# Patient Record
Sex: FEMALE | Race: WHITE | URBAN - METROPOLITAN AREA
[De-identification: names, ages, dates, MRNs, and addresses within clinical notes are randomized per-mention and may not be internally consistent; named-entity substitution may affect disease eponyms.]

---

## 2018-09-19 ENCOUNTER — ED HISTORICAL/CONVERTED ENCOUNTER (OUTPATIENT)
Dept: OTHER | Age: 48
End: 2018-09-19

## 2020-07-23 ENCOUNTER — OFFICE VISIT (OUTPATIENT)
Dept: PRIMARY CARE CLINIC | Age: 50
End: 2020-07-23
Payer: COMMERCIAL

## 2020-07-23 VITALS
DIASTOLIC BLOOD PRESSURE: 66 MMHG | TEMPERATURE: 97.1 F | RESPIRATION RATE: 18 BRPM | HEART RATE: 87 BPM | SYSTOLIC BLOOD PRESSURE: 118 MMHG | OXYGEN SATURATION: 98 %

## 2020-07-23 DIAGNOSIS — R50.9 FEVER, UNSPECIFIED FEVER CAUSE: Primary | ICD-10-CM

## 2020-07-23 DIAGNOSIS — H66.91 ACUTE BACTERIAL OTITIS MEDIA, RIGHT: ICD-10-CM

## 2020-07-23 PROCEDURE — 99203 OFFICE O/P NEW LOW 30 MIN: CPT | Performed by: NURSE PRACTITIONER

## 2020-07-23 RX ORDER — AMOXICILLIN 500 MG/1
500 CAPSULE ORAL 3 TIMES DAILY
Qty: 30 CAP | Refills: 0 | Status: SHIPPED | OUTPATIENT
Start: 2020-07-23 | End: 2020-08-02

## 2020-07-23 RX ORDER — NEOMYCIN SULFATE, POLYMYXIN B SULFATE AND HYDROCORTISONE 10; 3.5; 1 MG/ML; MG/ML; [USP'U]/ML
3 SUSPENSION/ DROPS AURICULAR (OTIC) 4 TIMES DAILY
Qty: 7.5 ML | Refills: 0 | Status: SHIPPED | OUTPATIENT
Start: 2020-07-23 | End: 2020-08-02

## 2020-07-23 NOTE — PATIENT INSTRUCTIONS
Per CDC guideline patient is to self quarantine until the results of the Covid 19 testing is completed. Also given written instruction on when to discontinue self quarantine as follows: (1)  3 days have passed since last fever without the use of fever reducing medications (2) AND improvement of symptoms AND (3) at least 3 days have passed since symptoms first appeared AND (4) 10 days have passed since first positive Covid 19 test (Patient has not tested as positive at this time and test results should be back in 6 to 10 days)Per CDC guideline patient is to self quarantine until the results of the Covid 19 testing is completed.   Also given written instruction on when to discontinue self quarantine as follows: (1)  3 days have passed since last fever without the use of fever reducing medications (2) AND improvement of symptoms AND (3) at least 3 days have passed since symptoms first appeared AND (4) 10 days have passed since first positive Covid 19 test (Patient has not tested as positive at this time and test results should be back in 6 to 10 days)

## 2020-07-23 NOTE — PROGRESS NOTES
Cynthia Beltran is a 47 y.o. female presents with symptoms of right ear pain/vertigo when she lays on her right side, fever, sore throat, nasal congestion and headache. She has a history of migraines and has not taken anything to make her symptoms better or worse. This all started  and she thinks that she has the ear infection because she works for the [employer] and forgot her stethoscope the last day she worked and had to use the community stethoscope. Chief Complaint   Patient presents with    Cough    Nasal Congestion    Headache    Sore Throat    Dizziness             No past medical history on file.   Family History   Family history unknown: Yes     Social History     Socioeconomic History    Marital status: Not on file     Spouse name: Not on file    Number of children: Not on file    Years of education: Not on file    Highest education level: Not on file   Occupational History    Not on file   Social Needs    Financial resource strain: Not on file    Food insecurity     Worry: Not on file     Inability: Not on file    Transportation needs     Medical: Not on file     Non-medical: Not on file   Tobacco Use    Smoking status: Former Smoker     Packs/day: 1.00     Years: 5.00     Pack years: 5.00     Types: Cigarettes     Last attempt to quit:      Years since quittin.5    Smokeless tobacco: Never Used   Substance and Sexual Activity    Alcohol use: Not on file    Drug use: Not on file    Sexual activity: Not on file   Lifestyle    Physical activity     Days per week: Not on file     Minutes per session: Not on file    Stress: Not on file   Relationships    Social connections     Talks on phone: Not on file     Gets together: Not on file     Attends Voodoo service: Not on file     Active member of club or organization: Not on file     Attends meetings of clubs or organizations: Not on file     Relationship status: Not on file    Intimate partner violence     Fear of current or ex partner: Not on file     Emotionally abused: Not on file     Physically abused: Not on file     Forced sexual activity: Not on file   Other Topics Concern    Not on file   Social History Narrative    Not on file     No current outpatient medications on file prior to visit. No current facility-administered medications on file prior to visit. Allergies   Allergen Reactions    Tylenol-Codeine #3 [Acetaminophen-Codeine] Other (comments)     Headache               Cough   The history is provided by the patient. This is a new problem. The current episode started more than 2 days ago. The problem occurs daily. The problem has not changed since onset. Associated symptoms include headaches. Nothing aggravates the symptoms. Nothing relieves the symptoms. She has tried nothing for the symptoms. The treatment provided no relief. Nasal Congestion    Associated symptoms include sore throat, cough and headaches. Headache   Associated symptoms include headaches. Sore Throat    Associated symptoms include headaches and cough. Dizziness    Associated symptoms include headaches and dizziness. Review of Systems   HENT: Positive for sore throat. Respiratory: Positive for cough. Neurological: Positive for dizziness and headaches. Objective  Physical Exam  Vitals signs reviewed. Constitutional:       Appearance: Normal appearance. She is normal weight. HENT:      Head: Normocephalic. Neck:      Musculoskeletal: Normal range of motion. Cardiovascular:      Rate and Rhythm: Normal rate and regular rhythm. Pulmonary:      Effort: Pulmonary effort is normal.      Breath sounds: Normal breath sounds. Abdominal:      General: Bowel sounds are normal.      Palpations: Abdomen is soft. Musculoskeletal: Normal range of motion. Skin:     General: Skin is warm and dry. Neurological:      Mental Status: She is alert and oriented to person, place, and time. Psychiatric:         Mood and Affect: Mood normal.         Behavior: Behavior normal.         Thought Content: Thought content normal.         Judgment: Judgment normal.          Assessment & Plan  Self administered nasal swab obtained and sent to [facility] for testing for covid 19 patient is to self quarantine until test results are obtained. Please see patient instructions for written instructions given to patient about parameters of self quarantine with symptoms/positive covid testing . Patient intructed to treate symptoms with OTC meds.   Calling in script for right ear pain  Nhung Ramos NP

## 2020-07-27 LAB — SARS-COV-2, NAA: NOT DETECTED

## 2020-07-28 ENCOUNTER — TELEPHONE (OUTPATIENT)
Dept: PRIMARY CARE CLINIC | Age: 50
End: 2020-07-28

## 2020-07-29 ENCOUNTER — TELEPHONE (OUTPATIENT)
Dept: PRIMARY CARE CLINIC | Age: 50
End: 2020-07-29

## 2023-03-20 ENCOUNTER — DOCUMENTATION ONLY (OUTPATIENT)
Dept: HEMATOLOGY | Age: 53
End: 2023-03-20

## 2023-03-20 NOTE — PROGRESS NOTES
Records received and put in Marjan's box for further review to determine if appointment needs to be moved up

## 2023-04-26 ENCOUNTER — OFFICE VISIT (OUTPATIENT)
Dept: NEUROLOGY | Age: 53
End: 2023-04-26
Payer: COMMERCIAL

## 2023-04-26 DIAGNOSIS — R41.840 ATTENTION DEFICIT: ICD-10-CM

## 2023-04-26 DIAGNOSIS — G31.84 MILD COGNITIVE IMPAIRMENT: Primary | ICD-10-CM

## 2023-04-26 DIAGNOSIS — F32.1 MODERATE MAJOR DEPRESSION (HCC): ICD-10-CM

## 2023-04-26 DIAGNOSIS — F90.0 ATTENTION DEFICIT HYPERACTIVITY DISORDER (ADHD), INATTENTIVE TYPE, MILD: Chronic | ICD-10-CM

## 2023-04-26 DIAGNOSIS — F41.9 MODERATE ANXIETY: ICD-10-CM

## 2023-04-26 PROCEDURE — 96138 PSYCL/NRPSYC TECH 1ST: CPT | Performed by: CLINICAL NEUROPSYCHOLOGIST

## 2023-04-26 PROCEDURE — 96137 PSYCL/NRPSYC TST PHY/QHP EA: CPT | Performed by: CLINICAL NEUROPSYCHOLOGIST

## 2023-04-26 PROCEDURE — 96139 PSYCL/NRPSYC TST TECH EA: CPT | Performed by: CLINICAL NEUROPSYCHOLOGIST

## 2023-04-26 PROCEDURE — 96132 NRPSYC TST EVAL PHYS/QHP 1ST: CPT | Performed by: CLINICAL NEUROPSYCHOLOGIST

## 2023-04-26 PROCEDURE — 96133 NRPSYC TST EVAL PHYS/QHP EA: CPT | Performed by: CLINICAL NEUROPSYCHOLOGIST

## 2023-04-26 PROCEDURE — 96136 PSYCL/NRPSYC TST PHY/QHP 1ST: CPT | Performed by: CLINICAL NEUROPSYCHOLOGIST

## 2023-07-25 ENCOUNTER — OFFICE VISIT (OUTPATIENT)
Age: 53
End: 2023-07-25
Payer: COMMERCIAL

## 2023-07-25 DIAGNOSIS — G31.84 MILD COGNITIVE IMPAIRMENT OF UNCERTAIN OR UNKNOWN ETIOLOGY: Primary | ICD-10-CM

## 2023-07-25 DIAGNOSIS — R41.840 ATTENTION AND CONCENTRATION DEFICIT: ICD-10-CM

## 2023-07-25 DIAGNOSIS — R41.89 OTHER SYMPTOMS AND SIGNS INVOLVING COGNITIVE FUNCTIONS AND AWARENESS: ICD-10-CM

## 2023-07-25 PROCEDURE — 90832 PSYTX W PT 30 MINUTES: CPT | Performed by: CLINICAL NEUROPSYCHOLOGIST

## 2023-07-25 NOTE — PROGRESS NOTES
Prior to seeing the patient I reviewed the records, including the previously completed report, the records in [facility], and any updated visits from other providers since I saw the patient last.      Today, I engaged in a psychoeducational and supportive and cognitive/behavioral psychotherapy session with the patient. I provided psychotherapy in the form of psychoeducation and support with respect to the results of the recent Neuropsychological Evaluation, including discussing individual tests as well as patient's areas of neurocognitive strength versus weakness. We discussed, in detail, the following: This patient generated a predominantly normal range Neuropsychological Evaluation with respect to neurocognitive  functioning. In this regard, the only impairments noted were for sustained visual attention and bilateral fine motor dexterity. Otherwise, her performances across all other neurocognitive domains assessed are well within or above the normal range. From an emotional standpoint, there is moderate depression and moderate anxiety. It is my opinion that the patient's reported (but not clinically corroborated) day-to-day memory problems are secondary to emotional distress primarily although there is also evidence of mild underlying attention deficit issue. .  In addition to continued  medical care, my recommendations include a review of her psychiatric medication management for depression and anxiety. She should be participating in at least weekly psychotherapy. Consider also an appropriate medication for attention if this is not  medically contraindicated, though caution is advised in selecting same, given the anxiety. With an improvement in mood and attention should bring with it and improve in day-to-day neurocognitive functioning. If not, follow-up neuropsychological evaluation  would then be indicated.   I am not concerned about capacity, driving, day-to-day supervision,

## 2024-01-05 ENCOUNTER — OFFICE VISIT (OUTPATIENT)
Age: 54
End: 2024-01-05
Payer: COMMERCIAL

## 2024-01-05 VITALS
TEMPERATURE: 97.8 F | OXYGEN SATURATION: 98 % | BODY MASS INDEX: 31.82 KG/M2 | DIASTOLIC BLOOD PRESSURE: 98 MMHG | WEIGHT: 179.6 LBS | SYSTOLIC BLOOD PRESSURE: 152 MMHG | HEIGHT: 63 IN | HEART RATE: 91 BPM

## 2024-01-05 DIAGNOSIS — Z86.69 HISTORY OF MIGRAINE HEADACHES: ICD-10-CM

## 2024-01-05 DIAGNOSIS — R16.0 LIVER MASS: ICD-10-CM

## 2024-01-05 DIAGNOSIS — K76.89 LIVER CYST: Primary | ICD-10-CM

## 2024-01-05 PROCEDURE — 99203 OFFICE O/P NEW LOW 30 MIN: CPT | Performed by: INTERNAL MEDICINE

## 2024-01-05 RX ORDER — ALPRAZOLAM 0.25 MG
TABLET ORAL
COMMUNITY
Start: 2023-10-23

## 2024-01-05 RX ORDER — LORATADINE 10 MG/1
TABLET ORAL
COMMUNITY
Start: 2017-10-20 | End: 2024-01-05

## 2024-01-05 RX ORDER — OMEPRAZOLE 10 MG/1
CAPSULE, DELAYED RELEASE ORAL
COMMUNITY
Start: 2023-03-13

## 2024-01-05 RX ORDER — CYCLOBENZAPRINE HCL 5 MG
TABLET ORAL
COMMUNITY
End: 2024-01-05

## 2024-01-05 RX ORDER — ACYCLOVIR 400 MG/1
TABLET ORAL
COMMUNITY
Start: 2022-10-10

## 2024-01-05 RX ORDER — CEFUROXIME AXETIL 250 MG/1
TABLET ORAL
COMMUNITY
Start: 2023-12-04 | End: 2024-01-05

## 2024-01-05 RX ORDER — DEXTROAMPHETAMINE SACCHARATE, AMPHETAMINE ASPARTATE, DEXTROAMPHETAMINE SULFATE, AND AMPHETAMINE SULFATE 1.25; 1.25; 1.25; 1.25 MG/1; MG/1; MG/1; MG/1
TABLET ORAL
COMMUNITY
Start: 2023-10-23

## 2024-01-05 RX ORDER — PROMETHAZINE HYDROCHLORIDE 25 MG/1
25 TABLET ORAL
COMMUNITY

## 2024-01-05 ASSESSMENT — PATIENT HEALTH QUESTIONNAIRE - PHQ9
SUM OF ALL RESPONSES TO PHQ QUESTIONS 1-9: 0
1. LITTLE INTEREST OR PLEASURE IN DOING THINGS: 0
SUM OF ALL RESPONSES TO PHQ QUESTIONS 1-9: 0
2. FEELING DOWN, DEPRESSED OR HOPELESS: 0
SUM OF ALL RESPONSES TO PHQ9 QUESTIONS 1 & 2: 0

## 2024-01-05 NOTE — PROGRESS NOTES
[facility]      Del Renae MD, FACP, FACG, FAASLD      SIRI Blake-CHANTAL Decker, [facility]   Melissa Eduardo, [facility]   Milly Bryson, [facility]-  Stuart Lozano, [facility]   Sara Valentine, [facility]   Shelly Weiron, [facility]   at [facility]   [address], [address]   [place]  [address] [phone]   FAX: [phone]  [facility]   [address], [address]   [place]  [address] [phone]   FAX: [phone]       Patient Care Team:  Amy Bishop MD as PCP - General  Dionte Mathis III, MD (Gastroenterology)      Patient Active Problem List   Diagnosis    Liver cyst    Liver mass    History of migraine headaches       The clinicians listed above have asked me to see Estelita Hernandez in consultation regarding a liver cyst and a liver mass.      All medical records sent by the referring physicians were reviewed including imaging studies     The patient is a 53 y.o. female without any history of previous liver disease.      The patient underwent a CT scan without IV contrast followed for evaluation of non-specific abdominal pain in 2/2023.    This demonstrated several small lesions 1.5 cm in the liver.    An Ultrasound of the liver in 2/2023 demonstrated a single cyst 0.6 cm in the right lobe.  None of the other lesions seen on CT were seen.    An dynamic MRI of the liver in 3/2023 demonstrated several small non-enhancing lesions consistent with small cysts the largest being 1.1 cm.  In inferior right lobe  there is a 1 cm delayed enhancing lesion which is felt to be compatible with adenoma or hemangioma.    AFP was normal.    The patient also underwent EGD and colonoscopy. Both of these were normal as well    In the office today the patient has the following symptoms:  The patient feels

## 2024-01-05 NOTE — PROGRESS NOTES
Identified pt with two pt identifiers(name and ). Reviewed record in preparation for visit and have obtained necessary documentation.    Chief Complaint   Patient presents with    New Patient     Establish care     BP (!) 152/98 (Site: Left Upper Arm, Position: Sitting, Cuff Size: Large Adult)   Pulse 91   Temp 97.8 °F (36.6 °C)   Ht 1.6 m (5' 3\")   Wt 81.5 kg (179 lb 9.6 oz)   SpO2 98%   BMI 31.81 kg/m²       1. \"Have you been to the ER, urgent care clinic since your last visit?  Hospitalized since your last visit?\" No    2. \"Have you seen or consulted any other health care providers outside of the [facility] since your last visit?\" No     Patient is accompanied by self I have received verbal consent from Estelita Hernandez to discuss any/all medical information while they are present in the room.

## 2024-01-05 NOTE — PROGRESS NOTES
Identified pt with two pt identifiers(name and ). Reviewed record in preparation for visit and have obtained necessary documentation.  There were no vitals filed for this visit.     Health Maintenance Review: Patient reminded of \"due or due soon\" health maintenance. I have asked the patient to contact his/her primary care provider (PCP) for follow-up on his/her health maintenance.    Coordination of Care Questionnaire:  :   1) Have you been to an emergency room, urgent care, or hospitalized since your last visit?  If yes, where when, and reason for visit? {yes/ no default no:[id number]}       2. Have seen or consulted any other health care provider since your last visit?   If yes, where when, and reason for visit?  {YES/NO:78577}      Patient is accompanied by {:[id number]} I have received verbal consent from Estelita Hernandez to discuss any/all medical information while they are present in the room.

## 2024-01-06 LAB
ALBUMIN SERPL-MCNC: 4.9 G/DL (ref 3.8–4.9)
ALP SERPL-CCNC: 85 IU/L (ref 44–121)
ALT SERPL-CCNC: 14 IU/L (ref 0–32)
AST SERPL-CCNC: 16 IU/L (ref 0–40)
BASOPHILS # BLD AUTO: 0 X10E3/UL (ref 0–0.2)
BASOPHILS NFR BLD AUTO: 1 %
BILIRUB DIRECT SERPL-MCNC: <0.1 MG/DL (ref 0–0.4)
BILIRUB SERPL-MCNC: 0.3 MG/DL (ref 0–1.2)
BUN SERPL-MCNC: 13 MG/DL (ref 6–24)
BUN/CREAT SERPL: 21 (ref 9–23)
CALCIUM SERPL-MCNC: 9.5 MG/DL (ref 8.7–10.2)
CANCER AG19-9 SERPL-ACNC: 6 U/ML (ref 0–35)
CEA SERPL-MCNC: <0.6 NG/ML (ref 0–4.7)
CHLORIDE SERPL-SCNC: 102 MMOL/L (ref 96–106)
CO2 SERPL-SCNC: 27 MMOL/L (ref 20–29)
CREAT SERPL-MCNC: 0.62 MG/DL (ref 0.57–1)
EGFRCR SERPLBLD CKD-EPI 2021: 106 ML/MIN/1.73
EOSINOPHIL # BLD AUTO: 0.1 X10E3/UL (ref 0–0.4)
EOSINOPHIL NFR BLD AUTO: 1 %
ERYTHROCYTE [DISTWIDTH] IN BLOOD BY AUTOMATED COUNT: 12.4 % (ref 11.7–15.4)
GLUCOSE SERPL-MCNC: 93 MG/DL (ref 70–99)
HBV CORE AB SERPL QL IA: NEGATIVE
HBV SURFACE AB SER QL: REACTIVE
HBV SURFACE AG SERPL QL IA: NEGATIVE
HCT VFR BLD AUTO: 42.1 % (ref 34–46.6)
HCV AB SERPL QL IA: NORMAL
HCV IGG SERPL QL IA: NON REACTIVE
HGB BLD-MCNC: 14.1 G/DL (ref 11.1–15.9)
IMM GRANULOCYTES # BLD AUTO: 0 X10E3/UL (ref 0–0.1)
IMM GRANULOCYTES NFR BLD AUTO: 0 %
INR PPP: 1 (ref 0.9–1.2)
LYMPHOCYTES # BLD AUTO: 1.7 X10E3/UL (ref 0.7–3.1)
LYMPHOCYTES NFR BLD AUTO: 25 %
MCH RBC QN AUTO: 28.8 PG (ref 26.6–33)
MCHC RBC AUTO-ENTMCNC: 33.5 G/DL (ref 31.5–35.7)
MCV RBC AUTO: 86 FL (ref 79–97)
MONOCYTES # BLD AUTO: 0.3 X10E3/UL (ref 0.1–0.9)
MONOCYTES NFR BLD AUTO: 5 %
NEUTROPHILS # BLD AUTO: 4.6 X10E3/UL (ref 1.4–7)
NEUTROPHILS NFR BLD AUTO: 68 %
PLATELET # BLD AUTO: 286 X10E3/UL (ref 150–450)
POTASSIUM SERPL-SCNC: 4.1 MMOL/L (ref 3.5–5.2)
PROT SERPL-MCNC: 7.5 G/DL (ref 6–8.5)
PROTHROMBIN TIME: 10.4 SEC (ref 9.1–12)
RBC # BLD AUTO: 4.89 X10E6/UL (ref 3.77–5.28)
SODIUM SERPL-SCNC: 142 MMOL/L (ref 134–144)
WBC # BLD AUTO: 6.8 X10E3/UL (ref 3.4–10.8)

## 2024-01-09 LAB
AFP L3 MFR SERPL: NORMAL % (ref 0–9.9)
AFP SERPL-MCNC: 1.9 NG/ML (ref 0–9.2)

## 2024-01-31 ENCOUNTER — HOSPITAL ENCOUNTER (OUTPATIENT)
Facility: HOSPITAL | Age: 54
Discharge: HOME | End: 2024-02-03
Payer: COMMERCIAL

## 2024-01-31 DIAGNOSIS — K76.89 LIVER CYST: ICD-10-CM

## 2024-01-31 PROCEDURE — A9577 INJ MULTIHANCE: HCPCS | Performed by: INTERNAL MEDICINE

## 2024-01-31 PROCEDURE — 6360000004 HC RX CONTRAST MEDICATION: Performed by: INTERNAL MEDICINE

## 2024-01-31 PROCEDURE — 74183 MRI ABD W/O CNTR FLWD CNTR: CPT

## 2024-01-31 RX ADMIN — GADOBENATE DIMEGLUMINE 16 ML: 529 INJECTION, SOLUTION INTRAVENOUS at 16:22

## 2024-02-06 ENCOUNTER — OFFICE VISIT (OUTPATIENT)
Age: 54
End: 2024-02-06
Payer: COMMERCIAL

## 2024-02-06 VITALS
HEART RATE: 86 BPM | TEMPERATURE: 97.1 F | OXYGEN SATURATION: 98 % | SYSTOLIC BLOOD PRESSURE: 125 MMHG | WEIGHT: 178 LBS | HEIGHT: 63 IN | BODY MASS INDEX: 31.54 KG/M2 | DIASTOLIC BLOOD PRESSURE: 90 MMHG

## 2024-02-06 DIAGNOSIS — D18.03 LIVER HEMANGIOMA: Primary | ICD-10-CM

## 2024-02-06 PROCEDURE — 99214 OFFICE O/P EST MOD 30 MIN: CPT | Performed by: INTERNAL MEDICINE

## 2024-02-06 NOTE — PROGRESS NOTES
Identified pt with two pt identifiers(name and ). Reviewed record in preparation for visit and have obtained necessary documentation.    Chief Complaint   Patient presents with    Follow-up     BP (!) 125/90 (Site: Left Upper Arm, Position: Sitting, Cuff Size: Large Adult)   Pulse 86   Temp 97.1 °F (36.2 °C) (Temporal)   Resp (!) 80   Ht 1.6 m (5' 3\")   Wt 80.7 kg (178 lb)   SpO2 98%   BMI 31.53 kg/m²       1. \"Have you been to the ER, urgent care clinic since your last visit?  Hospitalized since your last visit?\" No    2. \"Have you seen or consulted any other health care providers outside of the [facility] since your last visit?\" No     Patient is accompanied by self I have received verbal consent from Estelita Hernandez to discuss any/all medical information while they are present in the room.    Pt states she had a physical and some blood work last month    
  Glucose 70 - 99 mg/dL 93       Latest Ref Rng 1/5/2024   JOSELINE - Cancer Screening     AFP 0.0 - 9.2 ng/mL 1.9    AFP-L3% 0.0 - 9.9 % Comment    CA 19-9 0 - 35 U/mL 6    CEA 0.0 - 4.7 ng/mL <0.6        SEROLOGIES:   Latest Ref Rng 1/5/2024   JOSELINE - Serologies     Hep B Surface Ag Negative  Negative    Hep B Core Ab, Total Negative  Negative    Hep B Surface Ab  Reactive    Hep C Ab Non Reactive  Non Reactive        LIVER HISTOLOGY:  Not available or performed    ENDOSCOPIC PROCEDURES:  1/2023.  EGD by Dr Mathis.  Normal  3/2023.  Colonosocpy by Dr Mathis.  Normal    RADIOLOGY:  2/2023.  CT scan abdomen without IV contrast.  Normal appearing liver with multiple hypodense lesions the larges 1.5 cm.  Normal spleen.  No ascites.    2/2023.  Ultrasound of liver.  Multiple small cysts.  Largest 0.6 cm in right lobe.  No dilated bile ducts.  No ascites.    3/2023.  Dynamic MRI of liver.  Normal appearing liver.  Multiple small liver cysts the largest is 1.1 cm I right lobe.  A 1 cm delayed enhancing lesion in inferior right lobe.  Normal spleen.  No dilated bile ducts.  No bile duct strictures.  No ascites.    2/2024.  Dynamic MRI of liver.  Normal appearing liver.  Liver mass consistent with hemangioma in the right lobe measuring 1.1 cm.    OTHER TESTING:  Not available or performed    FOLLOW-UP:  All of the issues listed above in the Assessment and Plan were discussed with the patient.  All questions were answered.  The patient expressed a clear understanding of the above.    No follow-up at [facility] is needed.  I would be glad to see the patient back for follow-up at any time in the future if the clinical situation changes.      Del Renae MD  [address], [address]  [place]  [address] [phone]  [facility]

## 2024-02-19 ENCOUNTER — HOSPITAL ENCOUNTER (EMERGENCY)
Facility: HOSPITAL | Age: 54
Discharge: HOME | End: 2024-02-19
Attending: EMERGENCY MEDICINE
Payer: COMMERCIAL

## 2024-02-19 VITALS
BODY MASS INDEX: 31.89 KG/M2 | HEIGHT: 63 IN | RESPIRATION RATE: 16 BRPM | HEART RATE: 74 BPM | WEIGHT: 180 LBS | TEMPERATURE: 98.2 F | OXYGEN SATURATION: 100 % | DIASTOLIC BLOOD PRESSURE: 98 MMHG | SYSTOLIC BLOOD PRESSURE: 140 MMHG

## 2024-02-19 DIAGNOSIS — Z77.21 EXPOSURE TO BLOOD OR BODY FLUID: Primary | ICD-10-CM

## 2024-02-19 LAB
HBV SURFACE AB SER QL: REACTIVE
HBV SURFACE AB SER-ACNC: 234.23 MIU/ML
HCV AB SER IA-ACNC: 0.1 INDEX
HCV AB SERPL QL IA: NONREACTIVE
HIV 1+2 AB+HIV1 P24 AG SERPL QL IA: NONREACTIVE
HIV 1/2 RESULT COMMENT: NORMAL

## 2024-02-19 PROCEDURE — 87389 HIV-1 AG W/HIV-1&-2 AB AG IA: CPT

## 2024-02-19 PROCEDURE — 86706 HEP B SURFACE ANTIBODY: CPT

## 2024-02-19 PROCEDURE — 99282 EMERGENCY DEPT VISIT SF MDM: CPT

## 2024-02-19 PROCEDURE — 86803 HEPATITIS C AB TEST: CPT

## 2024-02-19 PROCEDURE — 36415 COLL VENOUS BLD VENIPUNCTURE: CPT

## 2024-02-19 ASSESSMENT — PAIN - FUNCTIONAL ASSESSMENT: PAIN_FUNCTIONAL_ASSESSMENT: NONE - DENIES PAIN

## 2024-02-19 NOTE — ED NOTES
Pt to await results from testing and will be contacted with said results. Pt to also check her mychart for results. Pt has no questions for myself or the provider. Pt ambulatory with a strong and steady gait upon DC.

## 2024-02-19 NOTE — ED PROVIDER NOTES
Skin:     General: Skin is warm and dry.      Capillary Refill: Capillary refill takes less than 2 seconds.   Neurological:      General: No focal deficit present.      Mental Status: She is alert. Mental status is at baseline.            SCREENINGS                  LAB, EKG AND DIAGNOSTIC RESULTS   Labs:  No results found for this or any previous visit (from the past 12 hour(s)).    EKG: Not Applicable    Radiologic Studies:  Non-plain film images such as CT, Ultrasound and MRI are read by the radiologist. Plain radiographic images are visualized and preliminarily interpreted by the ED Physician with the following findings: Not Applicable.    Interpretation per the Radiologist below, if available at the time of this note:  No orders to display        ED COURSE and DIFFERENTIAL DIAGNOSIS/MDM   10:05 AM DDx, ED Course, and Reassessment: Patient here after exposure to an IO needle that was used during a cardiac arrest.  Here for testing for any possible postexposure prophylaxis    Records Reviewed (source and summary of external notes): Prior medical records and Nursing notes    Vitals:    Vitals:    02/19/24 0633 02/19/24 0845   BP: (!) 141/91 (!) 140/98   Pulse: 74 74   Resp: 18 16   Temp: 98.2 °F (36.8 °C)    TempSrc: Oral    SpO2: 99% 100%   Weight: 81.6 kg (180 lb)    Height: 1.6 m (5' 3\")         ED COURSE  ED Course as of 02/19/24 1005   Mon Feb 19, 2024   1004 HIV and hepatitis testing ordered.  Patient has spoken with her employers and is aware of the protocol as well.  [facility] [KK[facility]      ED Course User Index  [KK] Carla Winkler MD       SEPSIS Reassessment: Sepsis reassessment not applicable    Clinical Management Tools:  Not Applicable    Patient was given the following medications:  Medications - No data to display    CONSULTS:   None     Social Determinants affecting Diagnosis/Treatment: None    Smoking Cessation: Not Applicable    PROCEDURES   Unless otherwise

## 2024-02-19 NOTE — ED TRIAGE NOTES
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

## 2024-04-25 ENCOUNTER — TELEPHONE (OUTPATIENT)
Age: 54
End: 2024-04-25

## 2024-04-25 NOTE — TELEPHONE ENCOUNTER
Called and confirmed appt and verified where to send link for virtual visit. - patient may call back with a different number to send the link to.

## 2024-04-26 ENCOUNTER — TELEPHONE (OUTPATIENT)
Age: 54
End: 2024-04-26

## 2024-04-26 ENCOUNTER — TELEMEDICINE (OUTPATIENT)
Age: 54
End: 2024-04-26
Payer: COMMERCIAL

## 2024-04-26 DIAGNOSIS — F41.9 ANXIETY AND DEPRESSION: ICD-10-CM

## 2024-04-26 DIAGNOSIS — F32.A ANXIETY AND DEPRESSION: ICD-10-CM

## 2024-04-26 DIAGNOSIS — G31.84 MILD COGNITIVE IMPAIRMENT OF UNCERTAIN OR UNKNOWN ETIOLOGY: Primary | ICD-10-CM

## 2024-04-26 PROCEDURE — 90791 PSYCH DIAGNOSTIC EVALUATION: CPT | Performed by: CLINICAL NEUROPSYCHOLOGIST

## 2024-04-26 NOTE — PROGRESS NOTES
Estelita Hernandez, was evaluated through a synchronous (real-time) audio-video encounter. The patient (or guardian if applicable) is aware that this is a billable service, which includes applicable co-pays. This Virtual Visit was conducted with patient's (and/or legal guardian's) consent. Patient identification was verified, and a caregiver was present when appropriate.   The patient was located at Home: [address] [place] [id number]  Provider was located at Facility (Appt Dept): [address] [place] [address]  [address] [place], [place] [id number]  Confirm you are appropriately licensed, registered, or certified to deliver care in the state where the patient is located as indicated above. If you are not or unsure, please re-schedule the visit: Yes, I confirm.     Estelita Hernandez (:  1970) is a New patient, presenting virtually for evaluation of the following:      [facility]  NEUROLOGY CLINIC   [address] [place] [address]   [phone] Office   [phone] Fax      Neuropsychology    Exam # 2    Initial Diagnostic Interview Note      Referral:  Amy Bishop MD    Estelita Hernandez is a 53 y.o. right handed   female who was unaccompanied to the initial clinical interview on 24 .  Please refer to her medical records for details pertaining to her history.   At the start of the appointment, I reviewed the patient's [facility] Epic Chart (including Media scanned in from previous providers) for the active Problem List, all pertinent Past Medical Hx, medications, recent radiologic and laboratory findings.  In addition, I reviewed pt's documented Immunization Record and Encounter History.       Pursuant to the emergency declaration under the Parikh Act and the National Emergencies Act, 1135 waiver authority and the Coronavirus Preparedness and Response Supplemental Appropriations Act, this Virtual

## 2024-04-29 ENCOUNTER — TELEPHONE (OUTPATIENT)
Age: 54
End: 2024-04-29

## 2024-05-14 ENCOUNTER — TELEPHONE (OUTPATIENT)
Age: 54
End: 2024-05-14

## 2024-05-14 NOTE — TELEPHONE ENCOUNTER
Patient is calling to advise that she tested positive for Lupus and wanted to advise incase that could be causing an interaction with the medications such as Adderall or Strattera.    Please advise.

## 2024-05-14 NOTE — TELEPHONE ENCOUNTER
Patient called to report she tested positive for Lupus and needs to know if she should be referred to another specialist / plan of care.        5/14/24@4277 Patient would like lab work if possible to check her liver.(KF)     5/17/24@1126 Place labs in the system and patient will send a [employer]i [employer] message on which [facility] she would like labs sent too. (CHAR)

## 2024-05-17 ENCOUNTER — TELEPHONE (OUTPATIENT)
Age: 54
End: 2024-05-17

## 2024-05-17 DIAGNOSIS — D18.03 LIVER HEMANGIOMA: Primary | ICD-10-CM

## 2024-05-20 ENCOUNTER — PROCEDURE VISIT (OUTPATIENT)
Age: 54
End: 2024-05-20
Payer: COMMERCIAL

## 2024-05-20 DIAGNOSIS — F90.0 ADHD (ATTENTION DEFICIT HYPERACTIVITY DISORDER), INATTENTIVE TYPE: Chronic | ICD-10-CM

## 2024-05-20 DIAGNOSIS — G31.84 MILD COGNITIVE IMPAIRMENT OF UNCERTAIN OR UNKNOWN ETIOLOGY: Primary | ICD-10-CM

## 2024-05-20 DIAGNOSIS — F32.A ANXIETY AND DEPRESSION: ICD-10-CM

## 2024-05-20 DIAGNOSIS — F41.9 ANXIETY AND DEPRESSION: ICD-10-CM

## 2024-05-20 PROCEDURE — 96132 NRPSYC TST EVAL PHYS/QHP 1ST: CPT | Performed by: CLINICAL NEUROPSYCHOLOGIST

## 2024-05-20 PROCEDURE — 96133 NRPSYC TST EVAL PHYS/QHP EA: CPT | Performed by: CLINICAL NEUROPSYCHOLOGIST

## 2024-05-20 PROCEDURE — 96139 PSYCL/NRPSYC TST TECH EA: CPT | Performed by: CLINICAL NEUROPSYCHOLOGIST

## 2024-05-20 PROCEDURE — 96138 PSYCL/NRPSYC TECH 1ST: CPT | Performed by: CLINICAL NEUROPSYCHOLOGIST

## 2024-05-21 NOTE — PROGRESS NOTES
those relationships that are maintained.  Self-concept is harsh and negative and she is inwardly troubled by self-doubt and misgivings about her adequacy than readily apparent to others.  She will tend to feel helpless and overwhelmed under mild pressure and may dependently seek the assistance of others.  Her self-reported level of treatment motivation remains somewhat low.       Impressions & Recommendations:  This patient has now undergone two Neuropsychological Evaluations with respect to neurocognitive  functioning.  In this regard, on first examination, the patient was found with chronic underlying attention deficit issue exacerbated by moderate depression and anxiety.  She was put on medication for attention.  In the time between exam 1 and exam 2, she reports a decline in memory related abilities over time.  Comparison of current with previous neurocognitive testing does not show an organic decline in neurocognitive functioning.  Processing speed has improved.  Attention deficit issues remain present, though, despite treatment for same.  From an emotional standpoint, moderate anxiety and depression has improved mild.  See personality profile above.      Thankfully and reassuringly, this profile remains inconsistent with MCI or dementia.  Instead, this remains a case of chronic ADHD-inattentive exacerbated by aging and mood related concerns.  I suggest a review of her current medication management for attention as well as counseling to assist with appears to be primarily functional depression and anxiety symptoms.  Consider psychiatric interventions if needed as well.  Exam reassuring.  I am not concerned about capacity, driving, day-to-day supervision, etc.  The patient to be encouraged to remain as mentally, physically, and socially active as possible.  We now have extensive baseline and updated neurocognitive and psychologic data on her.  Follow up as needed.  Clinical correlation is, of course,

## 2024-05-30 ENCOUNTER — TELEPHONE (OUTPATIENT)
Age: 54
End: 2024-05-30

## 2024-05-30 NOTE — TELEPHONE ENCOUNTER
Patient returning phone call from  to give [facility] fax # [phone].        5/30/24@7125 Labs faxed to number given per patient request.(CHAR)

## 2024-05-30 NOTE — TELEPHONE ENCOUNTER
Patient requesting a call to discuss being diagnosed by [facility] for [place] Palsdana.    She wants to speak with Dr. Alford about this.     She's requesting him to give her a recommendation for a doctor.

## 2024-05-31 LAB
AFP L3 MFR SERPL: NORMAL % (ref 0–9.9)
AFP SERPL-MCNC: 2 NG/ML (ref 0–9.2)
ALBUMIN SERPL-MCNC: 4.3 G/DL (ref 3.8–4.9)
ALP SERPL-CCNC: 82 IU/L (ref 44–121)
ALT SERPL-CCNC: 11 IU/L (ref 0–32)
AST SERPL-CCNC: 14 IU/L (ref 0–40)
BASOPHILS # BLD AUTO: 0 X10E3/UL (ref 0–0.2)
BASOPHILS NFR BLD AUTO: 1 %
BILIRUB DIRECT SERPL-MCNC: <0.1 MG/DL (ref 0–0.4)
BILIRUB SERPL-MCNC: 0.3 MG/DL (ref 0–1.2)
BUN SERPL-MCNC: 15 MG/DL (ref 6–24)
BUN/CREAT SERPL: 24 (ref 9–23)
CALCIUM SERPL-MCNC: 9 MG/DL (ref 8.7–10.2)
CANCER AG19-9 SERPL-ACNC: 5 U/ML (ref 0–35)
CEA SERPL-MCNC: <0.6 NG/ML (ref 0–4.7)
CHLORIDE SERPL-SCNC: 106 MMOL/L (ref 96–106)
CO2 SERPL-SCNC: 22 MMOL/L (ref 20–29)
CREAT SERPL-MCNC: 0.63 MG/DL (ref 0.57–1)
EGFRCR SERPLBLD CKD-EPI 2021: 105 ML/MIN/1.73
EOSINOPHIL # BLD AUTO: 0.1 X10E3/UL (ref 0–0.4)
EOSINOPHIL NFR BLD AUTO: 1 %
ERYTHROCYTE [DISTWIDTH] IN BLOOD BY AUTOMATED COUNT: 13 % (ref 11.7–15.4)
GLUCOSE SERPL-MCNC: 74 MG/DL (ref 70–99)
HBV CORE AB SERPL QL IA: NEGATIVE
HBV SURFACE AB SER QL: REACTIVE
HBV SURFACE AG SERPL QL IA: NEGATIVE
HCT VFR BLD AUTO: 39.2 % (ref 34–46.6)
HCV AB SERPL QL IA: NORMAL
HCV IGG SERPL QL IA: NON REACTIVE
HGB BLD-MCNC: 12.8 G/DL (ref 11.1–15.9)
IMM GRANULOCYTES # BLD AUTO: 0 X10E3/UL (ref 0–0.1)
IMM GRANULOCYTES NFR BLD AUTO: 0 %
INR PPP: 1 (ref 0.9–1.2)
LYMPHOCYTES # BLD AUTO: 1.5 X10E3/UL (ref 0.7–3.1)
LYMPHOCYTES NFR BLD AUTO: 23 %
MCH RBC QN AUTO: 29.2 PG (ref 26.6–33)
MCHC RBC AUTO-ENTMCNC: 32.7 G/DL (ref 31.5–35.7)
MCV RBC AUTO: 90 FL (ref 79–97)
MONOCYTES # BLD AUTO: 0.3 X10E3/UL (ref 0.1–0.9)
MONOCYTES NFR BLD AUTO: 5 %
NEUTROPHILS # BLD AUTO: 4.7 X10E3/UL (ref 1.4–7)
NEUTROPHILS NFR BLD AUTO: 70 %
PLATELET # BLD AUTO: 266 X10E3/UL (ref 150–450)
POTASSIUM SERPL-SCNC: 4.1 MMOL/L (ref 3.5–5.2)
PROT SERPL-MCNC: 6.4 G/DL (ref 6–8.5)
PROTHROMBIN TIME: 10.6 SEC (ref 9.1–12)
RBC # BLD AUTO: 4.38 X10E6/UL (ref 3.77–5.28)
SODIUM SERPL-SCNC: 144 MMOL/L (ref 134–144)
WBC # BLD AUTO: 6.6 X10E3/UL (ref 3.4–10.8)

## 2024-06-01 NOTE — TELEPHONE ENCOUNTER
Returned call and patient stated that every time she is on medication for her ADD she was having right sided facial pain.  She then started having the pain without being on medication,  [facility] diagnosed her with Bell's Palsy, She then returned because of the pain, and they said it was her bulging ear and placed her on antibiotics.  Had also been working in a cadaver lab through college classes.  She wanted to know who she should see.  Explained she could see any provider in the office, Dr Lopez and Dr Luke and Dr Avalos are all great.  She thanked me for calling her and the information.

## 2024-06-03 ENCOUNTER — TELEPHONE (OUTPATIENT)
Age: 54
End: 2024-06-03

## 2024-06-11 NOTE — TELEPHONE ENCOUNTER
Returned call and spoke to patient and told her if she chooses to see one, Dr Alford would recommend either Dr. Spencer or Rodrigue Carlos.  She said she was at work and couldn't write it down.  She will call back later to get the information.

## 2024-07-19 ENCOUNTER — OFFICE VISIT (OUTPATIENT)
Age: 54
End: 2024-07-19
Payer: COMMERCIAL

## 2024-07-19 DIAGNOSIS — F32.A ANXIETY AND DEPRESSION: ICD-10-CM

## 2024-07-19 DIAGNOSIS — G31.84 MILD COGNITIVE IMPAIRMENT OF UNCERTAIN OR UNKNOWN ETIOLOGY: Primary | ICD-10-CM

## 2024-07-19 DIAGNOSIS — F41.9 ANXIETY AND DEPRESSION: ICD-10-CM

## 2024-07-19 PROCEDURE — 96132 NRPSYC TST EVAL PHYS/QHP 1ST: CPT | Performed by: CLINICAL NEUROPSYCHOLOGIST

## 2024-07-19 NOTE — PROGRESS NOTES
perseveration   ETOH: Denied, used to drink here and there but has some liver issues so doesn't drink, 2 cm hemangioma and sees liver specialist coming up.     Tobacco:  Denied   Marijuana: Denied   Illicit: Denied   SI/HI: Denied   Psychosis: Denied   Insight: Within normal limits   Judgment: Within normal limits   Other Psych:                       The patient had the following concerns which I deferred to their referring provider:   1.Medications for cognition    She has been having facial pain.  Needs to consult with psychiatry, but doesn't want medication.  Is working with a therapist       I think she should apply for disability services.  Consider job change, toxic work environment contributing greatly to functional memory loss.     Has developed paranoia. Cameras up. Risk for psychotic break is high. Advise psychiatry consult and see psychotherapist at least week,ly, which is right now only once every three weeks., Patient refusing medication.      BILLING:  96132 x 1 Units     *Code 76636: Neuropsychological testing evaluation services include: Integration of patient data, interpretation of standardized test results and clinical data, clinical decision-making, treatment planning and report, and interactive feedback to the patient, family member(s) or caregiver(s), when performed.     On this date 7/19/2024 I have spent 40 minutes reviewing previous notes, test results and face to face (virtual) with the patient discussing the diagnosis and importance of compliance with the treatment plan as well as documenting on the day of the visit.    --Kevin Alford PSYD

## 2024-10-28 ENCOUNTER — TELEPHONE (OUTPATIENT)
Age: 54
End: 2024-10-28

## 2024-10-28 NOTE — TELEPHONE ENCOUNTER
HIPAA Verified (if caller is someone other than patient): yes       Reason for Call: Requesting re eval appt     If calling to cancel, does patient want to reschedule?   no    Is this call related to results?   yes    If yes, please let patient know they must wait for their follow up / feedback appointment to discuss.  They can, however,  a copy of the results at the clinic 2-3 weeks after their testing appt.     Is this a New Patient Appointment Request?   no    If yes:   Requested Provider (choose all that apply - patient doesn't need to call ALL locations):   Prashanth    Referred By:      Referral in chart?   N/A    Patient's Insurance Carrier (please ensure you gather insurance information):       Additional notes / reason for call:         **Please advise callers that it could take up to 5 business days for a return call**        Message: (any additional details from patient/caller not covered above)          Level 1 Calls - attempted to reach practice? no     Reason Call Marked High Priority (if applicable):

## 2024-11-04 NOTE — TELEPHONE ENCOUNTER
Will call back Wednesday morning, pt ws at work and did not feel comfortable verifying her information.

## 2024-11-12 ENCOUNTER — OFFICE VISIT (OUTPATIENT)
Age: 54
End: 2024-11-12
Payer: COMMERCIAL

## 2024-11-12 VITALS
DIASTOLIC BLOOD PRESSURE: 72 MMHG | HEART RATE: 74 BPM | BODY MASS INDEX: 29.41 KG/M2 | WEIGHT: 166 LBS | HEIGHT: 63 IN | SYSTOLIC BLOOD PRESSURE: 112 MMHG | OXYGEN SATURATION: 98 % | RESPIRATION RATE: 18 BRPM

## 2024-11-12 DIAGNOSIS — G50.0 TRIGEMINAL NEURALGIA OF RIGHT SIDE OF FACE: ICD-10-CM

## 2024-11-12 DIAGNOSIS — G44.86 CERVICOGENIC HEADACHE: ICD-10-CM

## 2024-11-12 DIAGNOSIS — R29.818 TRANSIENT NEUROLOGICAL SYMPTOMS: ICD-10-CM

## 2024-11-12 DIAGNOSIS — G43.709 CHRONIC MIGRAINE W/O AURA, NOT INTRACTABLE, W/O STAT MIGR: Primary | ICD-10-CM

## 2024-11-12 DIAGNOSIS — M54.81 OCCIPITAL NEURALGIA OF RIGHT SIDE: ICD-10-CM

## 2024-11-12 PROCEDURE — 99205 OFFICE O/P NEW HI 60 MIN: CPT | Performed by: PSYCHIATRY & NEUROLOGY

## 2024-11-12 RX ORDER — CYCLOBENZAPRINE HCL 10 MG
10 TABLET ORAL NIGHTLY
Qty: 30 TABLET | Refills: 5 | Status: SHIPPED | OUTPATIENT
Start: 2024-11-12 | End: 2024-11-15

## 2024-11-12 RX ORDER — CLINDAMYCIN PHOSPHATE 10 MG/G
GEL TOPICAL
COMMUNITY

## 2024-11-12 RX ORDER — FLUTICASONE PROPIONATE 50 MCG
SPRAY, SUSPENSION (ML) NASAL
COMMUNITY

## 2024-11-12 NOTE — PROGRESS NOTES
Chief Complaint   Patient presents with    Thyroid Problem       * New Patient Visit   Initial US done [facility]  Saw Dr Campbell and had FNA of isthmus nodule - non-diagnostic - not enough cells  Then saw Dr Weber - US done in their office - did get larger but did not think needed FNA until over 2.0 cm   Last US done a few years ago   TSH is normal     Family History of thyroid disease:  aunts - thyroid cancer, cousin with thyroid remove    Thyroid Symptoms  denies change in energy, denies change in weight, denies change in appetite, denies sleep issues, denies hair changes, no skin changes, denies sweats, denies hot/cold intolerance, denies tremor, denies palpitations, denies change in bowels, no change in menses, denies mood changes    Neck Symptoms  denies dysphagia, denies anterior neck pain, **has anterior neck swelling** on the right, notes no nodules    Labs/Studies    5/7/24 TSH 1.0    Found after visit in [facility]:  9/9/20 Neck US:      1/14/21 FNA - no malignant cells - but inadequate specimen     Past Medical History:   Diagnosis Date    ADHD (attention deficit hyperactivity disorder)     1st noted in collage 1998    Anxiety     1994 from [employer] + divorce    Asthma 1998    Chronic back pain 2008    Depression 01/2001    Migraine 2002    Sleep apnea       Blood pressure 110/76, pulse 76, height 1.6 m (5' 3\"), weight 75.8 kg (167 lb).         11/15/2024     1:51 PM 11/12/2024     7:53 AM 2/19/2024     6:33 AM 2/6/2024     1:07 PM 1/5/2024     2:22 PM   Weight Metrics   Weight 167 lb 166 lb 180 lb 178 lb 179 lb 9.6 oz   BMI (Calculated) 29.6 kg/m2 29.5 kg/m2 32 kg/m2 31.6 kg/m2 31.9 kg/m2      EXAM:  - not done today     Assessment/Plan:   1. Multiple thyroid nodules  Have requested prior US reports from prior Endos  Currently on US 2 calcified noduled and isthmus nodule 1.2 cm  Fine Needle Aspiration not indicated; would repeat one year (since found out about Fhx thyroid cancer so has concerns)   Did have

## 2024-11-12 NOTE — PROGRESS NOTES
Room:  I have reviewed all needed documentation in preparation for visit. Verified patient by name and date of birth  Chief Complaint   Patient presents with    Facial Pain     Right side, possible Bell's Palsey per [facility]    Migraine     Gotten worse-rt side facial pain, numbness, nausea and vomiting       Vitals:    11/12/24 0753   BP: 112/72   Pulse: 74   Resp: 18   SpO2: 98%   Weight: 75.3 kg (166 lb)   Height: 1.6 m (5' 3\")        Health Maintenance Due   Topic Date Due    Orthopox (Smallpox/Monkeypox) Vaccine (1 - Risk 2-dose series) 1970    Cervical cancer screen  Never done    Breast cancer screen  Never done    Shingles vaccine (1 of 2) Never done    Hepatitis B vaccine (3 of 3 - Hep B Twinrix 3-dose series) 04/30/2023    Flu vaccine (1) 08/01/2024    COVID-19 Vaccine (1 - 2023-24 season) Never done        1. \"Have you been to the ER, urgent care clinic since your last visit?  Hospitalized since your last visit?\" No     2. \"Have you seen or consulted any other health care providers outside of the [facility] since your last visit?\" Yes [facility],  endocrinologist, therapist, PCP     
packs/day: 1.00     Average packs/day: 1 pack/day for 36.3 years (36.3 ttl pk-yrs)     Types: Cigarettes     Start date: 7/21/1996    Smokeless tobacco: Never   Substance Use Topics    Alcohol use: Not Currently     Comment: no pattern / depended of mood, and celebration or events      No family history on file.     Subjective:      Estelita Hernandez is a 54 y.o. female RH with history of anxiety, asthma, sleep apnea, ADHD and migraine headaches who was [facility] by [facility] for evaluation of her right facial pain.    She thinks it is related to starting ADHD medications October of 2023  Condition occurred spring 2024  Taking ADHD medication then face got red and flushed  Right face looked swollen and more wrinkled  Went to [facility] and question of Bell's palsy - treated with antibiotics and acyclovir    Recurred few months later another episode of itchiness and like there is water on it. Treated with another round of antibiotics with relief  Last spell was June or July 2024    PCP - Dr. Bishop had advised her to see neurology for her headaches  Previously seen neurology in her 20s to 30s  Different now because it is associated right facial numbness and pain, peripheral vision issues and ataxia about 5 years ago  Not as often  Seen at [facility] around 5 years prior for a 4 day headache with associated signs and symptoms.  Gradual or sudden onset  Running into walls, funny feeling, smells prior  Right temporo-parietal and spreads all over  Squeezing pain  At its worst a 7/10  Associated with vision changes, light and noise sensitivity, nausea and occasional vomiting, sensitivity to certain smells, right facial numbness when worse  Last for 1 hour to 3 days  Occurs 3 times a week  Worse headaches 1 -2 times per month  Last was 2 weeks PTC  Excedrin migraine - helps  Ibuprofen and salonpas at night around the neck and shoulder helps  Phenergan to nausea - helps  Lack of sleep and certain foods may trigger it  Alcohol

## 2024-11-12 NOTE — PATIENT INSTRUCTIONS
under your spine, from your tailbone to your head.  Relax in this position for at least 15 to 30 seconds. Continue to breathe normally and don't hold your breath.  Repeat 2 to 4 times.  As you get used to this stretch, keep adding a little more time until you are able to relax in this position for up to 5 minutes. When you can relax for at least 2 minutes, you only need to do the exercise 1 time per session.  Goalpost stretch    Lie on your back on the floor (or a firm surface) with your knees bent and your feet about hip-width apart.  Tuck your chin, and relax your shoulders.  Reach your arms straight out to the sides.  Bend your elbows. Your arms should make an L on each side. Your palms should face up.  If you don't feel a mild stretch in your shoulders and across your chest, use a foam roller or tightly rolled towels under your spine, from your tailbone to your head.  Relax in this position for at least 15 to 30 seconds. Keep breathing normally.  Repeat 2 to 4 times.  As you get used to this stretch, keep adding a little more time until you are able to relax in this position for up to 5 minutes. When you can relax for at least 2 minutes, you only need to do the exercise 1 time each session.  Follow-up care is a key part of your treatment and safety. Be sure to make and go to all appointments, and call your doctor if you are having problems. It's also a good idea to know your test results and keep a list of the medicines you take.  Current as of: July 17, 2023  Content Version: 14.2  © 2024 [employer].   Care instructions adapted under license by [employer]. If you have questions about a medical condition or this instruction, always ask your healthcare professional. [employer] disclaims any warranty or liability for your use of this information.

## 2024-11-15 ENCOUNTER — OFFICE VISIT (OUTPATIENT)
Age: 54
End: 2024-11-15
Payer: COMMERCIAL

## 2024-11-15 VITALS
DIASTOLIC BLOOD PRESSURE: 76 MMHG | BODY MASS INDEX: 29.59 KG/M2 | HEART RATE: 76 BPM | WEIGHT: 167 LBS | HEIGHT: 63 IN | SYSTOLIC BLOOD PRESSURE: 110 MMHG

## 2024-11-15 DIAGNOSIS — Z80.8 FAMILY HISTORY OF THYROID CANCER: ICD-10-CM

## 2024-11-15 DIAGNOSIS — E04.2 MULTIPLE THYROID NODULES: Primary | ICD-10-CM

## 2024-11-15 PROCEDURE — 99205 OFFICE O/P NEW HI 60 MIN: CPT | Performed by: INTERNAL MEDICINE

## 2024-11-15 PROCEDURE — G2211 COMPLEX E/M VISIT ADD ON: HCPCS | Performed by: INTERNAL MEDICINE

## 2024-11-15 NOTE — PROGRESS NOTES
Diagnostic Thyroid Ultrasound    Date: 11/15/2024     Real time ultrasound of the thyroid gland was performed in both transverse and sagittal planes    Right Lobe:  Size: 1.47 x 1.39 x 4.45 cm  Texture: mostly Homogeneous with small flat hypo areas    Nodules: thick Ca 0.87 cm       Isthmus:    Size: 0.21 cm  Nodules: 1.15 x 0.39 x 1.20 cm mod hypo       Left Lobe:  Size: 1.56 x 1.10 x 3,46 cm   Texture: mostly Homogeneous with small flat hypo areas    Nodules: 0.80 thick Ca         Impression:  unchanged nodules from 2020 Ultrasound report       Isthmus      Left:      Right;

## 2024-11-15 NOTE — PROGRESS NOTES
Past Medical History:   Diagnosis Date    ADHD (attention deficit hyperactivity disorder)     1st noted in collage 1998    Anxiety     1994 from [employer] + divorce    Asthma 1998    Chronic back pain 2008    Depression 01/2001    Migraine 2002    Sleep apnea         Blood pressure 110/76, pulse 76, height 1.6 m (5' 3\"), weight 75.8 kg (167 lb).        11/15/2024     1:51 PM 11/12/2024     7:53 AM 2/19/2024     6:33 AM 2/6/2024     1:07 PM 1/5/2024     2:22 PM   Weight Metrics   Weight 167 lb 166 lb 180 lb 178 lb 179 lb 9.6 oz   BMI (Calculated) 29.6 kg/m2 29.5 kg/m2 32 kg/m2 31.6 kg/m2 31.9 kg/m2

## 2024-12-27 ENCOUNTER — TELEPHONE (OUTPATIENT)
Age: 54
End: 2024-12-27
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
## 2025-01-07 ENCOUNTER — TELEPHONE (OUTPATIENT)
Age: 55
End: 2025-01-07

## 2025-01-07 NOTE — TELEPHONE ENCOUNTER
Sent a secure chat to  to see what should be done about the patient and will call her once I know anything

## 2025-01-07 NOTE — TELEPHONE ENCOUNTER
Advised patient that once  surgery is finished and he has recovered we will call her to schedule her for a vv appt

## 2025-01-24 NOTE — TELEPHONE ENCOUNTER
Estelita is calling back about this. She said she's been waiting and hasn't heard anything. She said her PTSD and anxiety are just getting so bad and she can't control the paranoid thoughts. She said that Dr. PEREA had talked to her about taking a leave of absence from work and at the time she didn't think it was necessary, but has since reconsidered and would like to do this.  No available appts with Dr. PEREA VV or OV until April or May.     Please advise

## 2025-03-05 ENCOUNTER — TELEPHONE (OUTPATIENT)
Dept: ADMINISTRATIVE | Age: 55
End: 2025-03-05

## 2025-03-05 NOTE — TELEPHONE ENCOUNTER
Patient asking if the office could give her a call with the number for the psychiatrist she was being referred to, she misplaced it and has not seen one yet. She also asked to have her appointment moved to sooner date which it is not for 3/12. Please return call to discuss.

## 2025-03-12 ENCOUNTER — OFFICE VISIT (OUTPATIENT)
Age: 55
End: 2025-03-12

## 2025-03-12 DIAGNOSIS — F44.7 FUNCTIONAL NEUROLOGICAL SYMPTOM DISORDER WITH MIXED SYMPTOMS: Primary | ICD-10-CM

## 2025-03-12 DIAGNOSIS — F41.9 ANXIETY AND DEPRESSION: ICD-10-CM

## 2025-03-12 DIAGNOSIS — F32.A ANXIETY AND DEPRESSION: ICD-10-CM

## 2025-03-12 NOTE — PROGRESS NOTES
[facility] NEUROSCIENCE [facility]  NEUROLOGY CLINIC   [address]   [place] [address]   [phone] Office   [phone] Fax      Neuropsychology    Initial Diagnostic Interview Note      Referral:  Amy Bishop MD    Estelita Hernandez is a 54 y.o. Failed to redirect to the Timeline version of the [employer] SmartLink. who was accompanied to the initial clinical interview on .  Please refer to her medical records for details pertaining to her history.   At the start of the appointment, I reviewed the patient's [facility] Epic Chart (including Media scanned in from previous providers) for the active Problem List, all pertinent Past Medical Hx, medications, recent radiologic and laboratory findings.  In addition, I reviewed pt's documented Immunization Record and Encounter History.     Chief Complaint: New patient, establish care, for neurocognitive and psychologic concerns, as outlined above.     When I saw her last:      Neuropsychological Evaluation Report     Exam # 2  Referral:  Amy Bishop MD     Estelita Hernandez is a 53 y.o. right handed   female who was unaccompanied to the initial clinical interview on 4/26/24 .  Please refer to her medical records for details pertaining to her history.   At the start of the appointment, I reviewed the patient's [facility] Epic Chart (including Media scanned in from previous providers) for the active Problem List, all pertinent Past Medical Hx, medications, recent radiologic and laboratory findings.  In addition, I reviewed pt's documented Immunization Record and Encounter History.     When I saw her last:         Estelita Hernandez is a 52 y.o. right handed   female who was unaccompanied to the initial clinical interview on 4/10/23 .  Please refer to her medical records for details pertaining to her history.   At the start of the appointment, I reviewed  the [facility]'s [facility] Epic Chart

## 2025-03-25 ENCOUNTER — HOSPITAL ENCOUNTER (OUTPATIENT)
Facility: HOSPITAL | Age: 55
Discharge: HOME | End: 2025-03-28
Attending: PSYCHIATRY & NEUROLOGY
Payer: COMMERCIAL

## 2025-03-25 DIAGNOSIS — M54.81 OCCIPITAL NEURALGIA OF RIGHT SIDE: ICD-10-CM

## 2025-03-25 DIAGNOSIS — G50.0 TRIGEMINAL NEURALGIA OF RIGHT SIDE OF FACE: ICD-10-CM

## 2025-03-25 DIAGNOSIS — R29.818 TRANSIENT NEUROLOGICAL SYMPTOMS: ICD-10-CM

## 2025-03-25 PROCEDURE — 6360000004 HC RX CONTRAST MEDICATION: Performed by: PSYCHIATRY & NEUROLOGY

## 2025-03-25 PROCEDURE — 70553 MRI BRAIN STEM W/O & W/DYE: CPT

## 2025-03-25 PROCEDURE — A9579 GAD-BASE MR CONTRAST NOS,1ML: HCPCS | Performed by: PSYCHIATRY & NEUROLOGY

## 2025-03-25 RX ADMIN — GADOTERIDOL 14 ML: 279.3 INJECTION, SOLUTION INTRAVENOUS at 07:08

## 2025-04-03 ENCOUNTER — TELEPHONE (OUTPATIENT)
Age: 55
End: 2025-04-03

## 2025-04-03 NOTE — TELEPHONE ENCOUNTER
Called patient,  verified and seeing if available tomorrow 2025 to come to [place] location for appt with Dr. Luke at 10:20am to discuss MRI results. Patient accepted appt.

## 2025-04-03 NOTE — TELEPHONE ENCOUNTER
----- Message from Dr. Rohit Luke MD sent at 4/3/2025 11:45 AM EDT -----  Regarding: follow up  See if patient can come in tomorrow at 10:20 am to discuss brain MRI results

## 2025-04-04 ENCOUNTER — OFFICE VISIT (OUTPATIENT)
Age: 55
End: 2025-04-04

## 2025-04-04 VITALS
WEIGHT: 160 LBS | BODY MASS INDEX: 28.35 KG/M2 | HEIGHT: 63 IN | TEMPERATURE: 97.9 F | OXYGEN SATURATION: 100 % | RESPIRATION RATE: 16 BRPM | SYSTOLIC BLOOD PRESSURE: 134 MMHG | DIASTOLIC BLOOD PRESSURE: 82 MMHG | HEART RATE: 67 BPM

## 2025-04-04 DIAGNOSIS — G44.86 CERVICOGENIC HEADACHE: ICD-10-CM

## 2025-04-04 DIAGNOSIS — G50.0 TRIGEMINAL NEURALGIA OF RIGHT SIDE OF FACE: ICD-10-CM

## 2025-04-04 DIAGNOSIS — M54.81 OCCIPITAL NEURALGIA OF RIGHT SIDE: ICD-10-CM

## 2025-04-04 DIAGNOSIS — G43.709 CHRONIC MIGRAINE W/O AURA, NOT INTRACTABLE, W/O STAT MIGR: Primary | ICD-10-CM

## 2025-04-04 PROCEDURE — 99214 OFFICE O/P EST MOD 30 MIN: CPT | Performed by: PSYCHIATRY & NEUROLOGY

## 2025-04-04 ASSESSMENT — PATIENT HEALTH QUESTIONNAIRE - PHQ9
SUM OF ALL RESPONSES TO PHQ QUESTIONS 1-9: 1
SUM OF ALL RESPONSES TO PHQ QUESTIONS 1-9: 1
1. LITTLE INTEREST OR PLEASURE IN DOING THINGS: NOT AT ALL
SUM OF ALL RESPONSES TO PHQ QUESTIONS 1-9: 1
SUM OF ALL RESPONSES TO PHQ QUESTIONS 1-9: 1
2. FEELING DOWN, DEPRESSED OR HOPELESS: SEVERAL DAYS

## 2025-04-04 NOTE — PROGRESS NOTES
celebration or events      No family history on file.     Subjective:      Estelita Hernandez is a 54 y.o. female RH with history of anxiety, asthma, sleep apnea, ADHD and migraine headaches who [facility] of her right facial pain. Patient is here for follow up.    She thinks it is related to starting ADHD medications October of 2023  Condition occurred spring 2024  Taking ADHD medication then face got red and flushed  Right face looked swollen and more wrinkled  Went to [facility] and question of Bell's palsy - treated with antibiotics and acyclovir    Recurred few months later another episode of itchiness and like there is water on it. Treated with another round of antibiotics with relief  Last spell was June or July 2024    PCP - Dr. Bishop had advised her to see neurology for her headaches  Previously seen neurology in her 20s to 30s  Different now because it is associated right facial numbness and pain, peripheral vision issues and ataxia about 5 years ago  Not as often  Seen at [facility] around 5 years prior for a 4 day headache with associated signs and symptoms.  Gradual or sudden onset  Running into walls, funny feeling, smells prior  Right temporo-parietal and spreads all over  Squeezing pain  At its worst a 7/10  Associated with vision changes, light and noise sensitivity, nausea and occasional vomiting, sensitivity to certain smells, right facial numbness when worse  Last for 1 hour to 3 days  Occurs 3 times a week  Worse headaches 1 -2 times per month  Last was 2 weeks PTC  Excedrin migraine - helps  Ibuprofen and salonpas at night around the neck and shoulder helps  Phenergan to nausea - helps  Lack of sleep and certain foods may trigger it  Alcohol worsen her headache  Sleep may help  Previous neuroimaging 20 to 30 years ago    Works as an EMT     Review of records revealed:  Patient underwent neuropsychological testing last 4/26/2023 and resulted in normal range with